# Patient Record
Sex: FEMALE | Race: WHITE
[De-identification: names, ages, dates, MRNs, and addresses within clinical notes are randomized per-mention and may not be internally consistent; named-entity substitution may affect disease eponyms.]

---

## 2021-04-20 ENCOUNTER — HOSPITAL ENCOUNTER (EMERGENCY)
Dept: HOSPITAL 53 - M ED | Age: 26
Discharge: HOME | End: 2021-04-20
Payer: COMMERCIAL

## 2021-04-20 VITALS — WEIGHT: 135.58 LBS | BODY MASS INDEX: 20.08 KG/M2 | HEIGHT: 69 IN

## 2021-04-20 VITALS — SYSTOLIC BLOOD PRESSURE: 104 MMHG | DIASTOLIC BLOOD PRESSURE: 55 MMHG

## 2021-04-20 DIAGNOSIS — R11.2: ICD-10-CM

## 2021-04-20 DIAGNOSIS — F17.200: ICD-10-CM

## 2021-04-20 DIAGNOSIS — K52.9: Primary | ICD-10-CM

## 2021-04-20 DIAGNOSIS — Z97.5: ICD-10-CM

## 2021-04-20 DIAGNOSIS — R19.7: ICD-10-CM

## 2021-04-20 LAB
ALBUMIN SERPL BCG-MCNC: 4.5 GM/DL (ref 3.2–5.2)
ALT SERPL W P-5'-P-CCNC: 21 U/L (ref 12–78)
B-HCG SERPL QL: NEGATIVE
BASOPHILS # BLD AUTO: 0 10^3/UL (ref 0–0.2)
BASOPHILS NFR BLD AUTO: 0.2 % (ref 0–1)
BILIRUB CONJ SERPL-MCNC: 0.2 MG/DL (ref 0–0.2)
BILIRUB SERPL-MCNC: 0.9 MG/DL (ref 0.2–1)
EOSINOPHIL # BLD AUTO: 0.1 10^3/UL (ref 0–0.5)
EOSINOPHIL NFR BLD AUTO: 0.8 % (ref 0–3)
HCT VFR BLD AUTO: 41.9 % (ref 36–47)
HGB BLD-MCNC: 14.4 G/DL (ref 12–15.5)
LIPASE SERPL-CCNC: 39 U/L (ref 73–393)
LYMPHOCYTES # BLD AUTO: 0.3 10^3/UL (ref 1.5–5)
LYMPHOCYTES NFR BLD AUTO: 2.9 % (ref 24–44)
MCH RBC QN AUTO: 32.1 PG (ref 27–33)
MCHC RBC AUTO-ENTMCNC: 34.4 G/DL (ref 32–36.5)
MCV RBC AUTO: 93.3 FL (ref 80–96)
MONOCYTES # BLD AUTO: 0.5 10^3/UL (ref 0–0.8)
MONOCYTES NFR BLD AUTO: 4.6 % (ref 2–8)
NEUTROPHILS # BLD AUTO: 10.6 10^3/UL (ref 1.5–8.5)
NEUTROPHILS NFR BLD AUTO: 91.2 % (ref 36–66)
PLATELET # BLD AUTO: 287 10^3/UL (ref 150–450)
PROT SERPL-MCNC: 8.1 GM/DL (ref 6.4–8.2)
RBC # BLD AUTO: 4.49 10^6/UL (ref 4–5.4)
WBC # BLD AUTO: 11.7 10^3/UL (ref 4–10)

## 2021-04-20 PROCEDURE — 96361 HYDRATE IV INFUSION ADD-ON: CPT

## 2021-04-20 PROCEDURE — 80076 HEPATIC FUNCTION PANEL: CPT

## 2021-04-20 PROCEDURE — 80047 BASIC METABLC PNL IONIZED CA: CPT

## 2021-04-20 PROCEDURE — 83690 ASSAY OF LIPASE: CPT

## 2021-04-20 PROCEDURE — 81001 URINALYSIS AUTO W/SCOPE: CPT

## 2021-04-20 PROCEDURE — 85025 COMPLETE CBC W/AUTO DIFF WBC: CPT

## 2021-04-20 PROCEDURE — 96374 THER/PROPH/DIAG INJ IV PUSH: CPT

## 2021-04-20 PROCEDURE — 99284 EMERGENCY DEPT VISIT MOD MDM: CPT

## 2021-04-20 PROCEDURE — 84703 CHORIONIC GONADOTROPIN ASSAY: CPT

## 2021-04-20 PROCEDURE — 96375 TX/PRO/DX INJ NEW DRUG ADDON: CPT

## 2021-04-20 PROCEDURE — 74177 CT ABD & PELVIS W/CONTRAST: CPT

## 2021-04-20 NOTE — REPVR
PROCEDURE INFORMATION: 

Exam: CT Abdomen And Pelvis With Contrast 

Exam date and time: 4/20/2021 6:44 AM 

Age: 25 years old 

Clinical indication: Abdominal pain; Localized; Right; Additional info: Right 

abd pain, suspect appy 



TECHNIQUE: 

Imaging protocol: Computed tomography of the abdomen and pelvis with contrast. 

Radiation optimization: All CT scans at this facility use at least one of these 

dose optimization techniques: automated exposure control; mA and/or kV 

adjustment per patient size (includes targeted exams where dose is matched to 

clinical indication); or iterative reconstruction. 

Contrast material: ISO; Contrast volume: 100 ml; Contrast route: INTRAVENOUS 

(IV);  



COMPARISON: 

No relevant prior studies available. 



FINDINGS: 

Liver: Normal. No mass. 

Gallbladder and bile ducts: Normal. No calcified stones. No ductal dilation. 

Pancreas: Normal. No ductal dilation. 

Spleen: Normal. No splenomegaly. 

Adrenal glands: Normal. No mass. 

Kidneys and ureters: Normal. No hydronephrosis. 

Stomach and bowel: There is suggestion of small bowel wall thickening versus 

under distension. 

Appendix: No evidence of appendicitis. 



Intraperitoneal space: Unremarkable. No free air. No significant fluid 

collection. 

Vasculature: Unremarkable. No abdominal aortic aneurysm. 

Lymph nodes: There are shotty small bowel mesenteric lymph nodes. 

Urinary bladder: Unremarkable as visualized. 

Reproductive: IUD seen in place. 

Bones/joints: The proximal end a right femoral fixating rods and screws seen. 

Soft tissues: Unremarkable. 



IMPRESSION: 

1. Under distended small bowel loops limiting their evaluation for wall 

thickening coupled with shotty mesenteric lymph nodes. Underlying enteritis 

cannot be excluded. 

2. IUD in place. 



Electronically signed by: Sylvester Cortes On 04/20/2021  07:44:32 AM

## 2021-05-26 ENCOUNTER — HOSPITAL ENCOUNTER (INPATIENT)
Dept: HOSPITAL 53 - M ED | Age: 26
LOS: 2 days | Discharge: HOME | DRG: 755 | End: 2021-05-28
Attending: PSYCHIATRY & NEUROLOGY | Admitting: PSYCHIATRY & NEUROLOGY
Payer: COMMERCIAL

## 2021-05-26 VITALS — DIASTOLIC BLOOD PRESSURE: 82 MMHG | SYSTOLIC BLOOD PRESSURE: 123 MMHG

## 2021-05-26 VITALS — WEIGHT: 135.8 LBS | HEIGHT: 65 IN | BODY MASS INDEX: 22.63 KG/M2

## 2021-05-26 DIAGNOSIS — R05: ICD-10-CM

## 2021-05-26 DIAGNOSIS — Z20.822: ICD-10-CM

## 2021-05-26 DIAGNOSIS — F17.210: ICD-10-CM

## 2021-05-26 DIAGNOSIS — F10.11: ICD-10-CM

## 2021-05-26 DIAGNOSIS — R20.2: ICD-10-CM

## 2021-05-26 DIAGNOSIS — F43.25: Primary | ICD-10-CM

## 2021-05-26 LAB
ALBUMIN SERPL BCG-MCNC: 4.1 GM/DL (ref 3.2–5.2)
ALT SERPL W P-5'-P-CCNC: 24 U/L (ref 12–78)
AMPHETAMINES UR QL SCN: NEGATIVE
APAP SERPL-MCNC: < 2 UG/ML (ref 10–30)
B-HCG SERPL QL: NEGATIVE
BARBITURATES UR QL SCN: NEGATIVE
BENZODIAZ UR QL SCN: NEGATIVE
BILIRUB CONJ SERPL-MCNC: 0.1 MG/DL (ref 0–0.2)
BILIRUB SERPL-MCNC: 0.3 MG/DL (ref 0.2–1)
BUN SERPL-MCNC: 12 MG/DL (ref 7–18)
BZE UR QL SCN: NEGATIVE
CALCIUM SERPL-MCNC: 9.5 MG/DL (ref 8.5–10.1)
CANNABINOIDS UR QL SCN: NEGATIVE
CHLORIDE SERPL-SCNC: 107 MEQ/L (ref 98–107)
CO2 SERPL-SCNC: 28 MEQ/L (ref 21–32)
CREAT SERPL-MCNC: 0.75 MG/DL (ref 0.55–1.3)
ETHANOL SERPL-MCNC: < 0.003 % (ref 0–0.01)
GFR SERPL CREATININE-BSD FRML MDRD: > 60 ML/MIN/{1.73_M2} (ref 60–?)
GLUCOSE SERPL-MCNC: 93 MG/DL (ref 70–100)
HCT VFR BLD AUTO: 39.1 % (ref 36–47)
HGB BLD-MCNC: 12.9 G/DL (ref 12–15.5)
MCH RBC QN AUTO: 31.7 PG (ref 27–33)
MCHC RBC AUTO-ENTMCNC: 33 G/DL (ref 32–36.5)
MCV RBC AUTO: 96.1 FL (ref 80–96)
METHADONE UR QL SCN: NEGATIVE
OPIATES UR QL SCN: NEGATIVE
PCP UR QL SCN: NEGATIVE
PLATELET # BLD AUTO: 348 10^3/UL (ref 150–450)
POTASSIUM SERPL-SCNC: 4.1 MEQ/L (ref 3.5–5.1)
PROT SERPL-MCNC: 7.8 GM/DL (ref 6.4–8.2)
RBC # BLD AUTO: 4.07 10^6/UL (ref 4–5.4)
RSV RNA NPH QL NAA+PROBE: NEGATIVE
SALICYLATES SERPL-MCNC: 1.9 MG/DL (ref 5–30)
SODIUM SERPL-SCNC: 140 MEQ/L (ref 136–145)
TSH SERPL DL<=0.005 MIU/L-ACNC: 0.41 UIU/ML (ref 0.36–3.74)
WBC # BLD AUTO: 13.5 10^3/UL (ref 4–10)

## 2021-05-27 VITALS — SYSTOLIC BLOOD PRESSURE: 112 MMHG | DIASTOLIC BLOOD PRESSURE: 65 MMHG

## 2021-05-27 NOTE — REP
INDICATION:

Cough



COMPARISON:

None.



TECHNIQUE:

PA and lateral.



FINDINGS:

The mediastinum and cardiac silhouette are normal.  The lung fields are clear and

without acute consolidation, effusion, or pneumothorax.  The skeletal structures are

intact and normal.



IMPRESSION:

No acute cardiopulmonary process.





<Electronically signed by Meir Velázquez > 05/27/21 0627

## 2021-05-27 NOTE — REP
INDICATION:

trauma.



COMPARISON:

None.



TECHNIQUE:

Four views of the left wrist are provided.



FINDINGS:

Four views of the left wrist demonstrate normal bones, joints, and soft tissues.

Overall mineralization pattern is normal.  No fracture or subluxation is seen.



IMPRESSION:

Negative left wrist radiographs.  No fracture seen.





<Electronically signed by Neto Kruger > 05/27/21 0627

## 2021-05-27 NOTE — HPEPDOC
[facility] Medical History & Physical


Date of Admission


May 26, 2021


Date of Service:  May 27, 2021





History and Physical


CHIEF COMPLAINT: Unspecified depressive disorder





HISTORY OF PRESENT ILLNESS: Ms. Sellers is a 25 year old female who was seen in 

the inpatient mental health unit. Today, she feels well. Denies any fever, chest

pain, dyspnea, or abdominal pain. She does have a cough which started 1 month 

ago. She brings up light greenish-yellow phlegm. She is a current smoker. 

Otherwise, she appears well and non-toxic. Her lungs sound clear. 





PAST MEDICAL HISTORY:


1. Tobacco use


2. History of alcohol use disorder (now in remission)


3. History of left pneumothorax





PAST SURGICAL HISTORY:


1. Right femur surgery


2. Left wrist surgery





SOCIAL HISTORY:


Tobacco use: Current smoker, 2 years, 1 pack per week


ETOH: Sober


Illicit drug use: Denies





FAMILY HISTORY:


Father: Denies any known medical history


Mother: Denies any known medical history





ALLERGIES: Please see below.





REVIEW OF SYSTEMS:


CONSTITUTIONAL: Denies any fever or chills. 


ENT: Denies sore throat. 


RESPIRATORY: Denies shortness of breath. Reports cough with phlegm.


CARDIOVASCULAR: Denies chest pain.


GASTROINTESTINAL: Denies abdominal pain. Denies diarrhea.


GENITOURINARY: Denies dysuria.


CUTANEOUS: Denies rashes.


MUSCULOSKELETAL: Denies muscle weakness.


NEUROLOGICAL: Reports left hand paresthesia. History of injury to left hand


PSYCHOLOGICAL: Denies anxiety. Denies depression.





HOME MEDICATIONS: Please see below. 





PHYSICAL EXAMINATION:


VITAL SIGNS: Temperature 97.8, pulse 63, respiratory rate 20, blood pressure 

123/82, pulse oximetry 96% on room air.


GENERAL: Comfortable, in no apparent distress.


HEENT: Head normocephalic/atraumatic, EOMI, sclera clear.


NECK: Supple.


RESPIRATORY: Lungs clear to auscultation bilaterally, no rales, wheeze or rho

nchi.


CARDIOVASCULAR: Regular rate and rhythm.


ABDOMEN: Soft, nontender, no guarding or rebound tenderness. Normal bowel 

sounds.


MUSCLE SKELETAL: Muscle strength 5/5 in all extremities.


NEUROLOGICAL:  grossly intact, no focal deficits noted.


PSYCHOLOGICAL: Normal mood and affect





LABORATORY DATA: See below.





IMAGING: Radiologist interpretation


Left wrist XR


Negative left wrist radiographs.  No fracture seen.





MICROBIOLOGY: Please see below. 





ASSESSMENT and PLAN:


1. Unspecified depressive disorder


-Being managed in the inpatient mental health unit





2. Cough with phlegm


-Started about a month ago


-Possibly a allergies vs post-viral cough vs bronchitis


-Supportive care


-Will order a CXR to evaluate


-Patient lungs do sound clear and patient appears well at room air. She does not

appear toxic. Unlikely to be pneumonia. 





3. Left wrist paresthesia


-Works as a 


-History of injury to left wrist


-She has wrist splints at home. Recommend using wrist splints. If does not 

improve, she should follow up with her PCP to discuss management





Thank you for consulting us, we will sign off at this time. If there is any 

further questions or concerns, please do not hesitate to contact us.





Vital Signs





Vital Signs








  Date Time  Temp Pulse Resp B/P (MAP) Pulse Ox O2 Delivery O2 Flow Rate FiO2


 


5/26/21 22:27 99.5 63 20 123/82 (96) 96 Room Air  











Laboratory Data


Labs 24H


Laboratory Tests 2


5/26/21 16:55: 


Nucleated Red Blood Cells % (auto) 0.0, Anion Gap 5L, Glomerular Filtration Rate

> 60.0, Calcium Level 9.5, Total Bilirubin 0.3, Direct Bilirubin 0.1, Aspartate 

Amino Transf (AST/SGOT) 14, Alanine Aminotransferase (ALT/SGPT) 24, Alkaline 

Phosphatase 44L, Total Protein 7.8, Albumin 4.1, Albumin/Globulin Ratio 1.1L, 

Thyroid Stimulating Hormone (TSH) 0.408, Human Chorionic Gonadotropin, Qual 

NEGATIVE, Salicylates Level 1.9L, Urine Opiates Screen NEGATIVE, Urine Methadone

Screen NEGATIVE, Acetaminophen Level < 2.0L, Urine Barbiturates Screen NEGATIVE,

Urine Phencyclidine Screen NEGATIVE, Urine Amphetamines Screen NEGATIVE, Urine 

Benzodiazepines Screen NEGATIVE, Urine Cocaine Metabolite Screen NEGATIVE, Urine

Cannabinoids Screen NEGATIVE, Ethyl Alcohol Level < 0.003


5/26/21 17:26: 


Coronavirus (COVID-19)(PCR) NEGATIVE, Influenza Type A (RT-PCR) NEGATIVE, 

Influenza Type B (RT-PCR) NEGATIVE, Respiratory Syncytial Virus (PCR) NEGATIVE


CBC/BMP


Laboratory Tests


5/26/21 16:55











Home Medications


Scheduled PRN


Acetaminophen (Tylenol Extra Strength) 500 Mg Tablet, 1,000 MG PO TID PRN for 

PAIN





Allergies


Coded Allergies:  


     No Known Allergies (Unverified , 4/20/21)





A-FIB/CHADSVASC


A-FIB History


Current/History of A-Fib/PAF?:  No





Age/Risk Factor Scoring


CHADSVASC:  








CHADSVASC Response (Comments) Value


 


Gender Risk Factor Female 1


 


Hx of CHF No 0


 


Hx of HTN No 0


 


Hx of Stroke/TIA/or VTE No 0


 


Hx of Diabetes No 0


 


Hx of Vascular Disease No 0


 


Total  1

















DIGNA KUMAR DO               May 27, 2021 15:37

## 2021-05-27 NOTE — MHHPEPDOC
General


Date Of Admission:  May 26, 2021


Legal Status:  9.39


Chief Complaint


". I was driving with suspended license and that  stopped me for some reason

and I was afraid to go to nursing home





History of Present Illness


HISTORY OF THE PRESENT ILLNESS: Patient is a 25 -year-old , female, who

[has no previous psychiatric history but apparently had alcohol or use disorder 

and had 2 or 3 previous DWI arrest. Her last DWI arrest was in July 2018.  and 

she was sentenced and [facility]. She stated that 

she has been in for sobriety at since 07/02/2018 and has been fully employed and

 enjoying her life since. She was out driving yesterday with a suspended license

 and apparently was stopped by police for possible seatbelt by [employer] and she 

panicked because she knows she had a suspended license and in her panic. She 

grabbed the pocket knife from her purse and made a superficial cut on her neck. 

It was not serious and didn't require any stitches and she was medically cleared

 and admitted on 939 status. The routine labs were ordered within normal limits,

 and her tox screen was negative for any drugs or alcohol. She is strongly 

denying any suicidal intent. Denies any ongoing depression. Denies any new 

stressors and stated that it was her impulsive act in the moment of panic. She 

is pleasant, cooperative, well organized and in no acute distress and denies any

 serious depression or suicidal intent.





Psychiatric Review of Systems


Depression (2 or more weeks):  denies


Annabella (4 or more days of):  denies


Psychosis:  denies


PTSD:  denies


Anxiety:  situational anxiety, stressor related anxiety





Past Psychiatric History


Previous Psychiatric Diagnosis: [Alcohol use disorder].


Previous Psychiatric Admissions: [None].


Suicide Attempts:  formerly [facility] prior history of suicidal attempt or plan.


Psychiatric Follow-up: [, Not in any active treatment].


Psychiatric medications: [None].





Past Medical History


Medical Problems


Denies any major medical issues


Head Injury:  No


Seizures:  No


Hospitalizations:  No


Surgeries:  No





Family Medical/Psychiatric HX


Medical Problems


Noncontributory


Psychiatric Disorders:  No


Addiction:  No





Addiction History


denies





Social History


Childhood: [Was born in [place], finished high school].


Abuse/Trauma: No history of abuse or trauma .


Current Living Situation: [Lives with 2 roommates].


Education: [High school].


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


Social Support: [Mother and the her friends f].


Legal: [3 DWIs, last one in July 2018. Served one and half years].


Marital: [, Never , single].





Mental Status Examination


General Appearance:  well groomed, appears stated age


Build:  average


Demeanor:  average


Eye Contact:  average


Activity:  average


Behavior:  cooperative


Speech:  clear, spontaneous, normal volume


Mood:  anxious


Mood


Denies any ongoing depression and no history of annabella and no history of 

psychosis. No family history of major psychiatric disorder


Thought Process:  logical/linear


Thought Content (Delusions):  none reported


Thought Content (Other):  none reported


Thought Content (Aggressive):  none reported


Perception (Hallucinations):  none reported


Perception (Other):  none reported


Cognition (Impairment of):  none reported


Cognition(Intelligence Est.):  average


Oriented:  Awake, Alert, Oriented times three


Insight:  fair


Judgment:  Fair


Psychosis:  Denies





Diagnoses


Adjustment disorder with mixed emotion


Alcohol use disorder in full remission





A-FIB/CHADSVASC


A-FIB History


Current/History of A-Fib/PAF?:  No


Current PO Anticoag Therapy:  No





Age/Risk Factor Scoring


CHADSVASC:  








CHADSVASC Response (Comments) Value


 


Gender Risk Factor Female 1


 


Hx of CHF No 0


 


Hx of HTN No 0


 


Hx of Stroke/TIA/or VTE No 0


 


Hx of Diabetes No 0


 


Hx of Vascular Disease No 0


 


Total  1











Treatment


Treatment ordered:  NONE





Assessment


The patient does not appear to be clinically depressed and does not appear to be

acutely suicidal.





Initial Treatment Plan


1. Patient was admitted on a [9.39] status.


2. Complete history was obtained.


3. With patients permission, family will be contacted and database will be 

expanded. 


4. Patients medication regimen will be reviewed and changed accordingly. 


5. Patient will be provided with protected environment. 


6. Patient will be treated with individual, group, and milieu therapies. 


7. Patient will receive supportive psych-education.


8. Discharge planning will commence immediately.


9. Outpatient follow-up treatment will be strongly recommended.


10. The initial treatment plan will focus initially on:


* Depression.


* Risk for suicide.





ESTIMATED LENGTH OF STAY: [2]-[3] DAYS.





TIME SPENT COUNSELING AND COORDINATING INITIAL CARE: 45 minutes.





Tobacco Cessation Screen


If Patient is a Smoker


Nonsmoker


N/A-No Antipsychotics





Vital Signs





Vital Signs








  Date Time  Temp Pulse Resp B/P (MAP) Pulse Ox O2 Delivery O2 Flow Rate FiO2


 


5/26/21 22:27 99.5 63 20 123/82 (96) 96 Room Air  











Laboratory Data


24H Labs


Laboratory Tests 2


5/26/21 16:55: 


Nucleated Red Blood Cells % (auto) 0.0, Anion Gap 5L, Glomerular Filtration Rate

 > 60.0, Calcium Level 9.5, Total Bilirubin 0.3, Direct Bilirubin 0.1, Aspartate

 Amino Transf (AST/SGOT) 14, Alanine Aminotransferase (ALT/SGPT) 24, Alkaline 

Phosphatase 44L, Total Protein 7.8, Albumin 4.1, Albumin/Globulin Ratio 1.1L, 

Thyroid Stimulating Hormone (TSH) 0.408, Human Chorionic Gonadotropin, Qual 

NEGATIVE, Salicylates Level 1.9L, Urine Opiates Screen NEGATIVE, Urine Methadone

 Screen NEGATIVE, Acetaminophen Level < 2.0L, Urine Barbiturates Screen 

NEGATIVE, Urine Phencyclidine Screen NEGATIVE, Urine Amphetamines Screen 

NEGATIVE, Urine Benzodiazepines Screen NEGATIVE, Urine Cocaine Metabolite Screen

 NEGATIVE, Urine Cannabinoids Screen NEGATIVE, Ethyl Alcohol Level < 0.003


5/26/21 17:26: 


Coronavirus (COVID-19)(PCR) NEGATIVE, Influenza Type A (RT-PCR) NEGATIVE, 

Influenza Type B (RT-PCR) NEGATIVE, Respiratory Syncytial Virus (PCR) NEGATIVE


CBC/BMP


Laboratory Tests


5/26/21 16:55











Medications


Scheduled PRN


Acetaminophen (Tylenol Extra Strength) 500 Mg Tablet, 1,000 MG PO TID PRN for 

PAIN, (Reported)





Allergies


Coded Allergies:  


     No Known Allergies (Unverified , 4/20/21)











SAL NELSON M.D.                  May 27, 2021 11:48

## 2021-05-28 VITALS — DIASTOLIC BLOOD PRESSURE: 55 MMHG | SYSTOLIC BLOOD PRESSURE: 111 MMHG

## 2021-05-28 NOTE — MHDSPDOC
[facility] Discharge Summary


Discharge Summary


DATE OF ADMISSION: May 26, 2021 at 19:44 


DATE OF DISCHARGE: 05/28/2021





DISCHARGE DIAGNOSES:


1. . Adjustment disorder with mixed emotion


2. . Alcohol use disorder in remission





REASON FOR ADMISSION: Patient was stopped by police while driving with a 

suspended license. She has a history of DWI arrest and last arrest July 2018 and

has served one and half years and currently has her license suspended. When she 

was stopped by the police for seatbelt violation. She panicked and was scared of

going back to [facility] and impulsively made a superficial cut on her neck with a 

pocket knife





CONSULTANTS INVOLVED: None





TREATMENT AND PROGRESS ON THE UNIT : Patient was seen for supportive therapy and

lethality evaluation.  Routine labs CBC, CMP is within normal limits and her 

urine tox screen was negative for drugs or alcohol.





HOSPITAL COURSE: , She remained in good control and denies any serious 

depression or suicidal thoughts at all. She reports that she is in full sobriety

since 07/02/2018 and denies any serious depression and is gainfully employed and

happy with the progress. She does not show any clinical depressive symptoms and 

does not appear to be suicidal jeffrey will be discharged with follow-up outpatient 

counseling.





DISCHARGE ASSESSMENT: , Stable, not suicidal





MENTAL STATUS EXAMINATION ON DISCHARGE: 


Patient is a 25-year old female, who is , pleasant, in no acute distress.


Speech is  good. 


Language skills are good.


Thought processes including: , Organized, relevant. 


Thought content: . No suicidal thoughts.


Abstract reasoning, and computation: Good.


Description of associations: Organized .


Description of abnormal or psychotic thoughts: None.


Judgment: , Fair.


Insight: Good.


Orientation to , well oriented.


Recent and remote memory: Good.


Attention span and concentration: Good.


Language: .


Fund of knowledge: Average euthymic.


Affect: , Appropriate.





MEDICATIONS ON DISCHARGE: None


-  for .





PLAN/FOLLOWUP ARRANGEMENTS: Follow up with outpatient counseling.





The amount of time spent in the coordination of care for this patient was 

approximately 40 minutes.





ETOH/Disorder Med Rx


ETOH/DRUG DISORDER RX:  N/A





Vital Signs/I&Os





Vital Signs








  Date Time  Temp Pulse Resp B/P (MAP) Pulse Ox O2 Delivery O2 Flow Rate FiO2


 


5/28/21 07:02 97.8 61 16 111/55 (73) 97 Room Air  











Medications


No Active Prescriptions or Reported Meds





Allergies


Coded Allergies:  


     No Known Allergies (Unverified , 4/20/21)











SAL NELSON M.D.                  May 28, 2021 09:19

## 2023-01-26 ENCOUNTER — HOSPITAL ENCOUNTER (EMERGENCY)
Dept: HOSPITAL 53 - M ED | Age: 28
Discharge: HOME | End: 2023-01-26
Payer: MEDICAID

## 2023-01-26 VITALS — DIASTOLIC BLOOD PRESSURE: 78 MMHG | SYSTOLIC BLOOD PRESSURE: 144 MMHG

## 2023-01-26 VITALS — BODY MASS INDEX: 24.46 KG/M2 | WEIGHT: 146.83 LBS | HEIGHT: 65 IN
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
## 2023-01-26 DIAGNOSIS — S00.83XA: Primary | ICD-10-CM

## 2023-01-26 DIAGNOSIS — S10.91XA: ICD-10-CM

## 2023-01-26 DIAGNOSIS — F10.10: ICD-10-CM

## 2023-01-26 DIAGNOSIS — Z79.899: ICD-10-CM

## 2023-01-26 LAB
BASOPHILS # BLD AUTO: 0.1 10^3/UL (ref 0–0.2)
BASOPHILS NFR BLD AUTO: 0.6 % (ref 0–1)
EOSINOPHIL # BLD AUTO: 0.4 10^3/UL (ref 0–0.5)
EOSINOPHIL NFR BLD AUTO: 4 % (ref 0–3)
FT4I SERPL CALC-MCNC: 3.2 % (ref 1.3–4.8)
HCT VFR BLD AUTO: 40.8 % (ref 36–47)
HGB BLD-MCNC: 13.9 G/DL (ref 12–15.5)
LYMPHOCYTES # BLD AUTO: 1.8 10^3/UL (ref 1.5–5)
LYMPHOCYTES NFR BLD AUTO: 19.5 % (ref 24–44)
MCH RBC QN AUTO: 32 PG (ref 27–33)
MCHC RBC AUTO-ENTMCNC: 34.1 G/DL (ref 32–36.5)
MCV RBC AUTO: 93.8 FL (ref 80–96)
MONOCYTES # BLD AUTO: 0.7 10^3/UL (ref 0–0.8)
MONOCYTES NFR BLD AUTO: 7.7 % (ref 2–8)
NEUTROPHILS # BLD AUTO: 6.3 10^3/UL (ref 1.5–8.5)
NEUTROPHILS NFR BLD AUTO: 67.8 % (ref 36–66)
PLATELET # BLD AUTO: 317 10^3/UL (ref 150–450)
RBC # BLD AUTO: 4.35 10^6/UL (ref 4–5.4)
T3RU NFR SERPL: 36.5 % (ref 22.5–37)
T4 SERPL-MCNC: 8.7 UG/DL (ref 4.5–10.9)
TSH SERPL DL<=0.005 MIU/L-ACNC: 1.31 UIU/ML (ref 0.55–4.78)
WBC # BLD AUTO: 9.4 10^3/UL (ref 4–10)

## 2023-02-12 ENCOUNTER — HOSPITAL ENCOUNTER (INPATIENT)
Dept: HOSPITAL 53 - M ED | Age: 28
LOS: 2 days | Discharge: HOME | DRG: 133 | End: 2023-02-14
Attending: INTERNAL MEDICINE | Admitting: INTERNAL MEDICINE
Payer: COMMERCIAL

## 2023-02-12 VITALS — SYSTOLIC BLOOD PRESSURE: 122 MMHG | DIASTOLIC BLOOD PRESSURE: 72 MMHG

## 2023-02-12 VITALS — SYSTOLIC BLOOD PRESSURE: 98 MMHG | DIASTOLIC BLOOD PRESSURE: 58 MMHG

## 2023-02-12 VITALS — DIASTOLIC BLOOD PRESSURE: 69 MMHG | SYSTOLIC BLOOD PRESSURE: 131 MMHG

## 2023-02-12 VITALS — SYSTOLIC BLOOD PRESSURE: 142 MMHG | DIASTOLIC BLOOD PRESSURE: 74 MMHG

## 2023-02-12 VITALS — DIASTOLIC BLOOD PRESSURE: 65 MMHG | SYSTOLIC BLOOD PRESSURE: 133 MMHG

## 2023-02-12 VITALS — DIASTOLIC BLOOD PRESSURE: 61 MMHG | SYSTOLIC BLOOD PRESSURE: 101 MMHG

## 2023-02-12 VITALS — SYSTOLIC BLOOD PRESSURE: 132 MMHG | DIASTOLIC BLOOD PRESSURE: 65 MMHG

## 2023-02-12 VITALS — DIASTOLIC BLOOD PRESSURE: 60 MMHG | SYSTOLIC BLOOD PRESSURE: 112 MMHG

## 2023-02-12 VITALS — SYSTOLIC BLOOD PRESSURE: 139 MMHG | DIASTOLIC BLOOD PRESSURE: 73 MMHG

## 2023-02-12 VITALS — DIASTOLIC BLOOD PRESSURE: 77 MMHG | SYSTOLIC BLOOD PRESSURE: 137 MMHG

## 2023-02-12 VITALS — DIASTOLIC BLOOD PRESSURE: 56 MMHG | SYSTOLIC BLOOD PRESSURE: 113 MMHG

## 2023-02-12 VITALS — HEIGHT: 66 IN | WEIGHT: 136.69 LBS | BODY MASS INDEX: 21.97 KG/M2

## 2023-02-12 VITALS — DIASTOLIC BLOOD PRESSURE: 69 MMHG | SYSTOLIC BLOOD PRESSURE: 143 MMHG

## 2023-02-12 VITALS — SYSTOLIC BLOOD PRESSURE: 140 MMHG | DIASTOLIC BLOOD PRESSURE: 68 MMHG

## 2023-02-12 VITALS — DIASTOLIC BLOOD PRESSURE: 59 MMHG | SYSTOLIC BLOOD PRESSURE: 119 MMHG

## 2023-02-12 VITALS — DIASTOLIC BLOOD PRESSURE: 74 MMHG | SYSTOLIC BLOOD PRESSURE: 141 MMHG

## 2023-02-12 DIAGNOSIS — J96.01: Primary | ICD-10-CM

## 2023-02-12 DIAGNOSIS — G93.41: ICD-10-CM

## 2023-02-12 DIAGNOSIS — Z20.822: ICD-10-CM

## 2023-02-12 DIAGNOSIS — E87.20: ICD-10-CM

## 2023-02-12 DIAGNOSIS — F12.99: ICD-10-CM

## 2023-02-12 DIAGNOSIS — F10.129: ICD-10-CM

## 2023-02-12 LAB
ALBUMIN SERPL BCG-MCNC: 4.2 G/DL (ref 3.2–5.2)
ALP SERPL-CCNC: 39 U/L (ref 46–116)
ALT SERPL W P-5'-P-CCNC: 19 U/L (ref 7–40)
AMPHETAMINES UR QL SCN: NEGATIVE
APAP SERPL-MCNC: < 2 UG/ML (ref 10–20)
AST SERPL-CCNC: 24 U/L (ref ?–34)
BARBITURATES UR QL SCN: NEGATIVE
BASE EXCESS BLDA CALC-SCNC: -3.2 MMOL/L (ref -2–2)
BASOPHILS # BLD AUTO: 0 10^3/UL (ref 0–0.2)
BASOPHILS NFR BLD AUTO: 0.2 % (ref 0–1)
BENZODIAZ UR QL SCN: NEGATIVE
BILIRUB CONJ SERPL-MCNC: < 0.1 MG/DL (ref ?–0.4)
BILIRUB SERPL-MCNC: 0.3 MG/DL (ref 0.3–1.2)
BUN SERPL-MCNC: 13 MG/DL (ref 9–23)
BZE UR QL SCN: NEGATIVE
CALCIUM SERPL-MCNC: 8.3 MG/DL (ref 8.5–10.1)
CANNABINOIDS UR QL SCN: POSITIVE
CHLORIDE SERPL-SCNC: 103 MMOL/L (ref 98–107)
CO2 BLDA CALC-SCNC: 22.4 MEQ/L (ref 22–29)
CO2 SERPL-SCNC: 24 MMOL/L (ref 20–31)
CREAT SERPL-MCNC: 0.75 MG/DL (ref 0.55–1.3)
EOSINOPHIL # BLD AUTO: 0 10^3/UL (ref 0–0.5)
EOSINOPHIL NFR BLD AUTO: 0.2 % (ref 0–3)
ETHANOL SERPL-MCNC: 0.14 % (ref 0–0.01)
GFR SERPL CREATININE-BSD FRML MDRD: > 60 ML/MIN/{1.73_M2} (ref 60–?)
GLUCOSE SERPL-MCNC: 108 MG/DL (ref 60–100)
HCO3 BLDA-SCNC: 21.3 MEQ/L (ref 22–26)
HCO3 STD BLDA-SCNC: 21.8 MEQ/L (ref 22–26)
HCT VFR BLD AUTO: 35.6 % (ref 36–47)
HGB BLD-MCNC: 12.1 G/DL (ref 12–15.5)
LYMPHOCYTES # BLD AUTO: 1.1 10^3/UL (ref 1.5–5)
LYMPHOCYTES NFR BLD AUTO: 7 % (ref 24–44)
MCH RBC QN AUTO: 32 PG (ref 27–33)
MCHC RBC AUTO-ENTMCNC: 34 G/DL (ref 32–36.5)
MCV RBC AUTO: 94.2 FL (ref 80–96)
METHADONE UR QL SCN: NEGATIVE
MONOCYTES # BLD AUTO: 0.6 10^3/UL (ref 0–0.8)
MONOCYTES NFR BLD AUTO: 3.7 % (ref 2–8)
NEUTROPHILS # BLD AUTO: 13.9 10^3/UL (ref 1.5–8.5)
NEUTROPHILS NFR BLD AUTO: 88.3 % (ref 36–66)
OPIATES UR QL SCN: NEGATIVE
PCO2 BLDA: 36.3 MMHG (ref 35–45)
PCP UR QL SCN: NEGATIVE
PH BLDA: 7.39 UNITS (ref 7.35–7.45)
PLATELET # BLD AUTO: 277 10^3/UL (ref 150–450)
PO2 BLDA: 101.6 MMHG (ref 75–100)
POTASSIUM SERPL-SCNC: 4 MMOL/L (ref 3.5–5.1)
PROT SERPL-MCNC: 7.1 G/DL (ref 5.7–8.2)
RBC # BLD AUTO: 3.78 10^6/UL (ref 4–5.4)
RSV RNA NPH QL NAA+PROBE: NEGATIVE
SALICYLATES SERPL-MCNC: < 3 MG/DL (ref ?–30)
SAO2 % BLDA: 97.3 % (ref 95–99)
SODIUM SERPL-SCNC: 138 MMOL/L (ref 136–145)
TSH SERPL DL<=0.005 MIU/L-ACNC: 0.67 UIU/ML (ref 0.55–4.78)
WBC # BLD AUTO: 15.7 10^3/UL (ref 4–10)

## 2023-02-12 PROCEDURE — 5A1935Z RESPIRATORY VENTILATION, LESS THAN 24 CONSECUTIVE HOURS: ICD-10-PCS | Performed by: EMERGENCY MEDICINE

## 2023-02-12 PROCEDURE — 0BH17EZ INSERTION OF ENDOTRACHEAL AIRWAY INTO TRACHEA, VIA NATURAL OR ARTIFICIAL OPENING: ICD-10-PCS | Performed by: EMERGENCY MEDICINE

## 2023-02-12 RX ADMIN — ROCURONIUM BROMIDE PRN MG: 10 INJECTION INTRAVENOUS at 06:58

## 2023-02-12 RX ADMIN — Medication SCH MLS/HR: at 16:13

## 2023-02-12 RX ADMIN — DEXTROSE MONOHYDRATE SCH MG: 50 INJECTION, SOLUTION INTRAVENOUS at 10:50

## 2023-02-12 RX ADMIN — SODIUM CHLORIDE SCH UNITS: 4.5 INJECTION, SOLUTION INTRAVENOUS at 21:20

## 2023-02-12 RX ADMIN — MIDAZOLAM PRN MG: 1 INJECTION INTRAMUSCULAR; INTRAVENOUS at 14:21

## 2023-02-12 RX ADMIN — CHLORHEXIDINE GLUCONATE 0.12% ORAL RINSE SCH ML: 1.2 LIQUID ORAL at 10:50

## 2023-02-12 RX ADMIN — SODIUM CHLORIDE SCH MLS/HR: 9 INJECTION, SOLUTION INTRAVENOUS at 10:12

## 2023-02-12 RX ADMIN — SODIUM CHLORIDE SCH MLS/HR: 9 INJECTION, SOLUTION INTRAVENOUS at 21:00

## 2023-02-12 RX ADMIN — Medication SCH MLS/HR: at 13:19

## 2023-02-12 RX ADMIN — CHLORHEXIDINE GLUCONATE 0.12% ORAL RINSE SCH ML: 1.2 LIQUID ORAL at 20:59

## 2023-02-12 RX ADMIN — Medication SCH MLS/HR: at 20:59

## 2023-02-12 RX ADMIN — ROCURONIUM BROMIDE PRN MG: 10 INJECTION INTRAVENOUS at 08:10

## 2023-02-12 RX ADMIN — SODIUM CHLORIDE SCH MLS/HR: 9 INJECTION, SOLUTION INTRAVENOUS at 17:35

## 2023-02-13 VITALS — SYSTOLIC BLOOD PRESSURE: 130 MMHG | DIASTOLIC BLOOD PRESSURE: 76 MMHG

## 2023-02-13 VITALS — SYSTOLIC BLOOD PRESSURE: 129 MMHG | DIASTOLIC BLOOD PRESSURE: 83 MMHG

## 2023-02-13 VITALS — SYSTOLIC BLOOD PRESSURE: 135 MMHG | DIASTOLIC BLOOD PRESSURE: 64 MMHG

## 2023-02-13 VITALS — DIASTOLIC BLOOD PRESSURE: 95 MMHG | SYSTOLIC BLOOD PRESSURE: 143 MMHG

## 2023-02-13 VITALS — DIASTOLIC BLOOD PRESSURE: 86 MMHG | SYSTOLIC BLOOD PRESSURE: 154 MMHG

## 2023-02-13 VITALS — DIASTOLIC BLOOD PRESSURE: 78 MMHG | SYSTOLIC BLOOD PRESSURE: 138 MMHG

## 2023-02-13 VITALS — SYSTOLIC BLOOD PRESSURE: 146 MMHG | DIASTOLIC BLOOD PRESSURE: 67 MMHG

## 2023-02-13 VITALS — DIASTOLIC BLOOD PRESSURE: 73 MMHG | SYSTOLIC BLOOD PRESSURE: 140 MMHG

## 2023-02-13 VITALS — SYSTOLIC BLOOD PRESSURE: 127 MMHG | DIASTOLIC BLOOD PRESSURE: 67 MMHG

## 2023-02-13 VITALS — DIASTOLIC BLOOD PRESSURE: 71 MMHG | SYSTOLIC BLOOD PRESSURE: 130 MMHG

## 2023-02-13 VITALS — SYSTOLIC BLOOD PRESSURE: 118 MMHG | DIASTOLIC BLOOD PRESSURE: 58 MMHG

## 2023-02-13 VITALS — SYSTOLIC BLOOD PRESSURE: 128 MMHG | DIASTOLIC BLOOD PRESSURE: 67 MMHG

## 2023-02-13 VITALS — DIASTOLIC BLOOD PRESSURE: 72 MMHG | SYSTOLIC BLOOD PRESSURE: 144 MMHG

## 2023-02-13 VITALS — DIASTOLIC BLOOD PRESSURE: 67 MMHG | SYSTOLIC BLOOD PRESSURE: 127 MMHG

## 2023-02-13 VITALS — DIASTOLIC BLOOD PRESSURE: 67 MMHG | SYSTOLIC BLOOD PRESSURE: 146 MMHG

## 2023-02-13 VITALS — DIASTOLIC BLOOD PRESSURE: 67 MMHG | SYSTOLIC BLOOD PRESSURE: 130 MMHG

## 2023-02-13 VITALS — DIASTOLIC BLOOD PRESSURE: 65 MMHG | SYSTOLIC BLOOD PRESSURE: 139 MMHG

## 2023-02-13 VITALS — SYSTOLIC BLOOD PRESSURE: 130 MMHG | DIASTOLIC BLOOD PRESSURE: 71 MMHG

## 2023-02-13 LAB
ALBUMIN SERPL BCG-MCNC: 3.6 G/DL (ref 3.2–5.2)
ALP SERPL-CCNC: 41 U/L (ref 46–116)
ALT SERPL W P-5'-P-CCNC: 18 U/L (ref 7–40)
AST SERPL-CCNC: 9 U/L (ref ?–34)
BASE EXCESS BLDA CALC-SCNC: -1.5 MMOL/L (ref -2–2)
BILIRUB SERPL-MCNC: 0.6 MG/DL (ref 0.3–1.2)
BUN SERPL-MCNC: 12 MG/DL (ref 9–23)
CALCIUM SERPL-MCNC: 7.8 MG/DL (ref 8.5–10.1)
CHLORIDE SERPL-SCNC: 116 MMOL/L (ref 98–107)
CO2 BLDA CALC-SCNC: 23.6 MEQ/L (ref 22–29)
CO2 SERPL-SCNC: 20 MMOL/L (ref 20–31)
CREAT SERPL-MCNC: 0.79 MG/DL (ref 0.55–1.3)
GFR SERPL CREATININE-BSD FRML MDRD: > 60 ML/MIN/{1.73_M2} (ref 60–?)
GLUCOSE SERPL-MCNC: 69 MG/DL (ref 60–100)
HCO3 BLDA-SCNC: 22.5 MEQ/L (ref 22–26)
HCO3 STD BLDA-SCNC: 23.2 MEQ/L (ref 22–26)
HCT VFR BLD AUTO: 36.7 % (ref 36–47)
HGB BLD-MCNC: 12.1 G/DL (ref 12–15.5)
MAGNESIUM SERPL-MCNC: 1.9 MG/DL (ref 1.8–2.4)
MCH RBC QN AUTO: 31.8 PG (ref 27–33)
MCHC RBC AUTO-ENTMCNC: 33 G/DL (ref 32–36.5)
MCV RBC AUTO: 96.6 FL (ref 80–96)
PCO2 BLDA: 35.3 MMHG (ref 35–45)
PH BLDA: 7.42 UNITS (ref 7.35–7.45)
PLATELET # BLD AUTO: 249 10^3/UL (ref 150–450)
PO2 BLDA: 93 MMHG (ref 75–100)
POTASSIUM SERPL-SCNC: 3.9 MMOL/L (ref 3.5–5.1)
PROT SERPL-MCNC: 6.1 G/DL (ref 5.7–8.2)
RBC # BLD AUTO: 3.8 10^6/UL (ref 4–5.4)
SAO2 % BLDA: 96.8 % (ref 95–99)
SODIUM SERPL-SCNC: 143 MMOL/L (ref 136–145)
WBC # BLD AUTO: 8.1 10^3/UL (ref 4–10)

## 2023-02-13 RX ADMIN — MIDAZOLAM PRN MG: 1 INJECTION INTRAMUSCULAR; INTRAVENOUS at 02:29

## 2023-02-13 RX ADMIN — SODIUM CHLORIDE SCH UNITS: 4.5 INJECTION, SOLUTION INTRAVENOUS at 21:56

## 2023-02-13 RX ADMIN — FOLIC ACID SCH MG: 1 TABLET ORAL at 10:24

## 2023-02-13 RX ADMIN — ACETAMINOPHEN PRN MG: 500 TABLET ORAL at 10:27

## 2023-02-13 RX ADMIN — Medication SCH MG: at 21:57

## 2023-02-13 RX ADMIN — SODIUM CHLORIDE SCH UNITS: 4.5 INJECTION, SOLUTION INTRAVENOUS at 05:08

## 2023-02-13 RX ADMIN — Medication SCH MG: at 10:23

## 2023-02-13 RX ADMIN — MIDAZOLAM PRN MG: 1 INJECTION INTRAMUSCULAR; INTRAVENOUS at 05:20

## 2023-02-13 RX ADMIN — SODIUM CHLORIDE SCH UNITS: 4.5 INJECTION, SOLUTION INTRAVENOUS at 13:30

## 2023-02-13 RX ADMIN — DEXTROSE MONOHYDRATE SCH MG: 50 INJECTION, SOLUTION INTRAVENOUS at 10:27

## 2023-02-13 RX ADMIN — SODIUM CHLORIDE SCH MLS/HR: 9 INJECTION, SOLUTION INTRAVENOUS at 05:06

## 2023-02-13 RX ADMIN — MULTIPLE VITAMINS W/ MINERALS TAB SCH TAB: TAB at 10:23

## 2023-02-13 RX ADMIN — ACETAMINOPHEN PRN MG: 500 TABLET ORAL at 21:57

## 2023-02-14 VITALS — DIASTOLIC BLOOD PRESSURE: 48 MMHG | SYSTOLIC BLOOD PRESSURE: 101 MMHG

## 2023-02-14 LAB
ALBUMIN SERPL BCG-MCNC: 3.1 G/DL (ref 3.2–5.2)
ALP SERPL-CCNC: 39 U/L (ref 46–116)
ALT SERPL W P-5'-P-CCNC: 17 U/L (ref 7–40)
AST SERPL-CCNC: 21 U/L (ref ?–34)
BASOPHILS # BLD AUTO: 0 10^3/UL (ref 0–0.2)
BASOPHILS NFR BLD AUTO: 0.3 % (ref 0–1)
BILIRUB SERPL-MCNC: 0.6 MG/DL (ref 0.3–1.2)
BUN SERPL-MCNC: 10 MG/DL (ref 9–23)
CALCIUM SERPL-MCNC: 8.2 MG/DL (ref 8.5–10.1)
CHLORIDE SERPL-SCNC: 110 MMOL/L (ref 98–107)
CO2 SERPL-SCNC: 25 MMOL/L (ref 20–31)
CREAT SERPL-MCNC: 0.88 MG/DL (ref 0.55–1.3)
EOSINOPHIL # BLD AUTO: 0.3 10^3/UL (ref 0–0.5)
EOSINOPHIL NFR BLD AUTO: 3.9 % (ref 0–3)
GFR SERPL CREATININE-BSD FRML MDRD: > 60 ML/MIN/{1.73_M2} (ref 60–?)
GLUCOSE SERPL-MCNC: 83 MG/DL (ref 60–100)
HCT VFR BLD AUTO: 30.6 % (ref 36–47)
HGB BLD-MCNC: 10.2 G/DL (ref 12–15.5)
LYMPHOCYTES # BLD AUTO: 2.2 10^3/UL (ref 1.5–5)
LYMPHOCYTES NFR BLD AUTO: 33.8 % (ref 24–44)
MCH RBC QN AUTO: 32.4 PG (ref 27–33)
MCHC RBC AUTO-ENTMCNC: 33.3 G/DL (ref 32–36.5)
MCV RBC AUTO: 97.1 FL (ref 80–96)
MONOCYTES # BLD AUTO: 0.7 10^3/UL (ref 0–0.8)
MONOCYTES NFR BLD AUTO: 10.8 % (ref 2–8)
NEUTROPHILS # BLD AUTO: 3.3 10^3/UL (ref 1.5–8.5)
NEUTROPHILS NFR BLD AUTO: 50.9 % (ref 36–66)
PLATELET # BLD AUTO: 226 10^3/UL (ref 150–450)
POTASSIUM SERPL-SCNC: 3.5 MMOL/L (ref 3.5–5.1)
PROT SERPL-MCNC: 5.5 G/DL (ref 5.7–8.2)
RBC # BLD AUTO: 3.15 10^6/UL (ref 4–5.4)
SODIUM SERPL-SCNC: 142 MMOL/L (ref 136–145)
WBC # BLD AUTO: 6.5 10^3/UL (ref 4–10)

## 2023-02-14 RX ADMIN — MULTIPLE VITAMINS W/ MINERALS TAB SCH TAB: TAB at 09:18

## 2023-02-14 RX ADMIN — Medication SCH MG: at 09:19

## 2023-02-14 RX ADMIN — SODIUM CHLORIDE SCH UNITS: 4.5 INJECTION, SOLUTION INTRAVENOUS at 06:20

## 2023-02-14 RX ADMIN — FOLIC ACID SCH MG: 1 TABLET ORAL at 09:19

## 2023-03-24 ENCOUNTER — HOSPITAL ENCOUNTER (OUTPATIENT)
Dept: HOSPITAL 53 - M OUTALCOH | Age: 28
End: 2023-03-24
Attending: PSYCHIATRY & NEUROLOGY
Payer: COMMERCIAL

## 2023-03-24 DIAGNOSIS — Z13.39: Primary | ICD-10-CM

## 2023-05-10 ENCOUNTER — HOSPITAL ENCOUNTER (EMERGENCY)
Dept: HOSPITAL 53 - M ED | Age: 28
Discharge: LEFT BEFORE DISCHARGE | End: 2023-05-10
Payer: COMMERCIAL

## 2023-05-10 VITALS — DIASTOLIC BLOOD PRESSURE: 80 MMHG | SYSTOLIC BLOOD PRESSURE: 122 MMHG

## 2023-05-10 VITALS — WEIGHT: 136.03 LBS | BODY MASS INDEX: 22.66 KG/M2 | HEIGHT: 65 IN

## 2023-05-10 DIAGNOSIS — Z53.21: Primary | ICD-10-CM

## 2024-10-12 ENCOUNTER — HOSPITAL ENCOUNTER (EMERGENCY)
Dept: HOSPITAL 53 - M ED | Age: 29
Discharge: HOME | End: 2024-10-12
Payer: COMMERCIAL

## 2024-10-12 VITALS — OXYGEN SATURATION: 98 % | SYSTOLIC BLOOD PRESSURE: 117 MMHG | DIASTOLIC BLOOD PRESSURE: 71 MMHG

## 2024-10-12 VITALS — BODY MASS INDEX: 21.63 KG/M2 | HEIGHT: 65 IN | WEIGHT: 129.85 LBS

## 2024-10-12 VITALS — TEMPERATURE: 98 F

## 2024-10-12 DIAGNOSIS — F19.11: ICD-10-CM

## 2024-10-12 DIAGNOSIS — F32.A: ICD-10-CM

## 2024-10-12 DIAGNOSIS — F17.200: ICD-10-CM

## 2024-10-12 DIAGNOSIS — R56.9: Primary | ICD-10-CM

## 2024-10-12 LAB
AMPHETAMINES UR QL SCN: NEGATIVE
B-HCG SERPL QL: NEGATIVE
BARBITURATES UR QL SCN: NEGATIVE
BASOPHILS # BLD AUTO: 0.1 10^3/UL (ref 0–0.2)
BASOPHILS NFR BLD AUTO: 0.4 % (ref 0–1)
BENZODIAZ UR QL SCN: NEGATIVE
BUN SERPL-MCNC: 21 MG/DL (ref 9–23)
BZE UR QL SCN: NEGATIVE
CALCIUM SERPL-MCNC: 9.4 MG/DL (ref 8.5–10.1)
CANNABINOIDS UR QL SCN: POSITIVE
CHLORIDE SERPL-SCNC: 107 MMOL/L (ref 98–107)
CO2 SERPL-SCNC: 25 MMOL/L (ref 20–31)
CREAT SERPL-MCNC: 0.91 MG/DL (ref 0.55–1.3)
EOSINOPHIL # BLD AUTO: 0.1 10^3/UL (ref 0–0.5)
EOSINOPHIL NFR BLD AUTO: 0.9 % (ref 0–3)
ETHANOL SERPL-MCNC: < 0.003 % (ref 0–0.01)
GFR SERPL CREATININE-BSD FRML MDRD: > 60 ML/MIN/{1.73_M2} (ref 60–?)
GLUCOSE SERPL-MCNC: 99 MG/DL (ref 60–100)
HCT VFR BLD AUTO: 37.1 % (ref 36–47)
HGB BLD-MCNC: 12.5 G/DL (ref 12–15.5)
LYMPHOCYTES # BLD AUTO: 1.3 10^3/UL (ref 1.5–5)
LYMPHOCYTES NFR BLD AUTO: 9.9 % (ref 24–44)
MCH RBC QN AUTO: 31.4 PG (ref 27–33)
MCHC RBC AUTO-ENTMCNC: 33.7 G/DL (ref 32–36.5)
MCV RBC AUTO: 93.2 FL (ref 80–96)
METHADONE UR QL SCN: NEGATIVE
MONOCYTES # BLD AUTO: 0.7 10^3/UL (ref 0–0.8)
MONOCYTES NFR BLD AUTO: 5.5 % (ref 2–8)
NEUTROPHILS # BLD AUTO: 11.1 10^3/UL (ref 1.5–8.5)
NEUTROPHILS NFR BLD AUTO: 83 % (ref 36–66)
OPIATES UR QL SCN: NEGATIVE
PCP UR QL SCN: NEGATIVE
PLATELET # BLD AUTO: 281 10^3/UL (ref 150–450)
POTASSIUM SERPL-SCNC: 3.5 MMOL/L (ref 3.5–5.1)
RBC # BLD AUTO: 3.98 10^6/UL (ref 4–5.4)
SALICYLATES SERPL-MCNC: < 3 MG/DL (ref ?–30)
SODIUM SERPL-SCNC: 140 MMOL/L (ref 136–145)
TSH SERPL DL<=0.005 MIU/L-ACNC: 0.46 UIU/ML (ref 0.55–4.78)
WBC # BLD AUTO: 13.3 10^3/UL (ref 4–10)